# Patient Record
Sex: FEMALE | Race: ASIAN | NOT HISPANIC OR LATINO | URBAN - METROPOLITAN AREA
[De-identification: names, ages, dates, MRNs, and addresses within clinical notes are randomized per-mention and may not be internally consistent; named-entity substitution may affect disease eponyms.]

---

## 2024-08-12 ENCOUNTER — EMERGENCY (EMERGENCY)
Facility: HOSPITAL | Age: 54
LOS: 1 days | Discharge: HOME | End: 2024-08-12
Attending: EMERGENCY MEDICINE
Payer: COMMERCIAL

## 2024-08-12 VITALS
TEMPERATURE: 98 F | SYSTOLIC BLOOD PRESSURE: 144 MMHG | OXYGEN SATURATION: 98 % | RESPIRATION RATE: 20 BRPM | DIASTOLIC BLOOD PRESSURE: 89 MMHG | HEART RATE: 100 BPM | WEIGHT: 138.89 LBS | HEIGHT: 59 IN

## 2024-08-12 PROCEDURE — 71045 X-RAY EXAM CHEST 1 VIEW: CPT | Mod: 26

## 2024-08-12 PROCEDURE — 93010 ELECTROCARDIOGRAM REPORT: CPT

## 2024-08-12 PROCEDURE — 99284 EMERGENCY DEPT VISIT MOD MDM: CPT

## 2024-08-12 RX ORDER — KETOROLAC TROMETHAMINE 10 MG
30 TABLET ORAL ONCE
Refills: 0 | Status: DISCONTINUED | OUTPATIENT
Start: 2024-08-12 | End: 2024-08-12

## 2024-08-12 RX ORDER — BACTERIOSTATIC SODIUM CHLORIDE 0.9 %
1000 VIAL (ML) INJECTION ONCE
Refills: 0 | Status: COMPLETED | OUTPATIENT
Start: 2024-08-12 | End: 2024-08-12

## 2024-08-12 RX ADMIN — Medication 30 MILLIGRAM(S): at 21:00

## 2024-08-12 RX ADMIN — Medication 1000 MILLILITER(S): at 21:00

## 2024-08-12 NOTE — ED PROVIDER NOTE - PHYSICAL EXAMINATION
Constitutional - well-developed.   Head - NCAT. Airway patent. mouth with very poor dentition. no evidence of abscess currently.  Eyes - PERRL.   CV - RRR. no murmur. no edema.   Pulm - CTAB.   Abd - soft, nt. no rebound. no guarding.   Neuro - A&Ox3. strength 5/5 x4. sensation intact x4. normal gait.   Skin - No rash. .  MSK - normal ROM.   b/L toes with no swelling. no erythema. no ttp. and cap refill <2 sec.  chaperone PCA Zaynab Laroque L chest wall with no ttp. no erythema. no fluctuance.

## 2024-08-12 NOTE — ED PROVIDER NOTE - PATIENT PORTAL LINK FT
You can access the FollowMyHealth Patient Portal offered by [facility] by registering at the following website: [email or web address]. By joining [employer]’s FollowMyHealth portal, you will also be able to view your health information using other applications (apps) compatible with our system.

## 2024-08-12 NOTE — ED PROVIDER NOTE - OBJECTIVE STATEMENT
Pt is a 55 yo F co weakness.  Pt states that she recently moved here from [facility]. Pt had Stage II breast CA in 2019 treated with resection and chemo. pt states that over the past week she has had generalized weakness and pain at the site of her prior mastectomy on the L. Pt also reports having dental pain a couple of months ago for which she had a dental extraction for and B/L toe pain. no sob. no n/v. no fever/chills.

## 2024-08-12 NOTE — ED STATDOCS - PROGRESS NOTE DETAILS
, attendin-year-old female history of stage II breast cancer s/p left sided  mastectomy and lymph node resection in 2019 followed by chemoradiation, was told "in remission "presents with 1 week of left-sided chest pain/breast pain over the mastectomy site.  Patient also reports dental pain and bilateral toe pain.  Patient came from [facility] 3 weeks ago.   patient and her sister tried to go to [facility] but was told to come to the ED for "full work up".  will move patient to the main ED for further evaluation by another provider.

## 2024-08-12 NOTE — ED ADULT TRIAGE NOTE - CHIEF COMPLAINT QUOTE
Pt arrives to ED  with multiple c/o - L great toe pain , L breast pain, L sided tooth ache-  generalized weakness- Hx of breast CA in remission

## 2024-08-12 NOTE — ED PROVIDER NOTE - CLINICAL SUMMARY MEDICAL DECISION MAKING FREE TEXT BOX
Pt with chest wall pain and other nonrelated symptoms.  will check basic labs and give ivf and toradol. Pt signed out to dr. cleveland pending labs and if normal to d/c with outpatient f/up.

## 2024-08-13 VITALS
SYSTOLIC BLOOD PRESSURE: 135 MMHG | TEMPERATURE: 98 F | DIASTOLIC BLOOD PRESSURE: 84 MMHG | RESPIRATION RATE: 18 BRPM | OXYGEN SATURATION: 99 % | HEART RATE: 86 BPM

## 2024-08-13 PROCEDURE — 93005 ELECTROCARDIOGRAM TRACING: CPT

## 2024-08-13 PROCEDURE — 71045 X-RAY EXAM CHEST 1 VIEW: CPT

## 2024-08-13 PROCEDURE — 85025 COMPLETE CBC W/AUTO DIFF WBC: CPT

## 2024-08-13 PROCEDURE — 36415 COLL VENOUS BLD VENIPUNCTURE: CPT

## 2024-08-13 PROCEDURE — 99285 EMERGENCY DEPT VISIT HI MDM: CPT | Mod: 25

## 2024-08-13 PROCEDURE — 96374 THER/PROPH/DIAG INJ IV PUSH: CPT

## 2024-08-13 PROCEDURE — 80053 COMPREHEN METABOLIC PANEL: CPT

## 2024-08-13 NOTE — ED ADULT NURSE NOTE - OBJECTIVE STATEMENT
pt comes into ED A&Ox4 with daughter at the bedside with multiple medical complaints. daughter states that pt is from [facility] and has not seen a PCP in a while, hx of breast cancer but in remission. pink banded on L arm. NAD. pt breathing even and unlabored on room air.

## 2025-03-14 NOTE — ED ADULT NURSE NOTE - NS ED NURSE LEVEL OF CONSCIOUSNESS ORIENTATION
Post Op Clinic Visit      Jumana Thayer  is 18 months  post-op from [employer] gastric bypass.   Abdominal pain has been absent.          Pre-Surgery Weight: 266 lbs  TBW lost:  176 lbs  % EBW lost: 84 %   %TBW lost: 66%  Patient has lost a total of 190 lbs from start of program.    Physical Exam:    There were no vitals taken for this visit.    General: No acute distress  HEENT: P PERRLA, no scleral icterus.  Trachea midline.  Thoracic: Clear to auscultation bilaterally.  Cardiac: Regular rate and rhythm with no rubs clicks or murmurs.  Abdomen: The incisions are healing well.There are no hernias    Extremities: No clubbing cyanosis or edema.  Neurologic: No gross motor or sensory defects.        Assessment and Plan:      Jumana Thayer   is 18 months  post-op from [employer] gastric bypass.  and doing well.       Pre-Surgery Weight: 266 lbs  TBW lost:  176 lbs  % EBW lost: 98 %  %TBW lost: 66%  Patient has lost a total of 190 lbs from start of program.       Follow up in 3 months with labs now.    In addition to the plan above, we discussed the following:    THE BIG FOUR GUIDELINES:  1.  Count calories!  People count calories eat less.  Use the daily plate or other apps for your smart phone or computer.  The more you count the more of an expert you will become and will get easier and easier.  If you are trying to lose weight and exercising a lot, keep calories to around the thousand to 1200 a day.  If you are not exercising consistently try to limit them to around 800 a day.    2.  Separate liquids and solids.  Wait 30 minutes to an hour after you eat before drinking liquids.  This will reduce the total amount of food you eat in the meal.    3.  Exercise!  You need up to 5 hours a week with a combination of cardio and resistance or weight training in order to keep excess body fat off.    4.  Sleep!   Try to get 7 or more hours of sleep a night.  If you have obstructive sleep apnea definitely use  Oriented - self; Oriented - place; Oriented - time